# Patient Record
Sex: MALE | Race: WHITE | NOT HISPANIC OR LATINO
[De-identification: names, ages, dates, MRNs, and addresses within clinical notes are randomized per-mention and may not be internally consistent; named-entity substitution may affect disease eponyms.]

---

## 2021-11-23 ENCOUNTER — TRANSCRIPTION ENCOUNTER (OUTPATIENT)
Age: 31
End: 2021-11-23

## 2022-03-24 ENCOUNTER — APPOINTMENT (OUTPATIENT)
Dept: HUMAN REPRODUCTION | Facility: CLINIC | Age: 32
End: 2022-03-24

## 2022-06-15 ENCOUNTER — NON-APPOINTMENT (OUTPATIENT)
Age: 32
End: 2022-06-15

## 2022-06-16 PROBLEM — Z00.00 ENCOUNTER FOR PREVENTIVE HEALTH EXAMINATION: Status: ACTIVE | Noted: 2022-06-16

## 2022-06-18 ENCOUNTER — OUTPATIENT (OUTPATIENT)
Dept: OUTPATIENT SERVICES | Facility: HOSPITAL | Age: 32
LOS: 1 days | End: 2022-06-18
Payer: COMMERCIAL

## 2022-06-18 ENCOUNTER — APPOINTMENT (OUTPATIENT)
Dept: ULTRASOUND IMAGING | Facility: CLINIC | Age: 32
End: 2022-06-18
Payer: COMMERCIAL

## 2022-06-18 DIAGNOSIS — R10.12 LEFT UPPER QUADRANT PAIN: ICD-10-CM

## 2022-06-18 PROCEDURE — 76700 US EXAM ABDOM COMPLETE: CPT

## 2022-06-18 PROCEDURE — 76700 US EXAM ABDOM COMPLETE: CPT | Mod: 26

## 2023-04-22 ENCOUNTER — NON-APPOINTMENT (OUTPATIENT)
Age: 33
End: 2023-04-22

## 2023-05-18 ENCOUNTER — NON-APPOINTMENT (OUTPATIENT)
Age: 33
End: 2023-05-18

## 2023-05-18 ENCOUNTER — APPOINTMENT (OUTPATIENT)
Dept: ORTHOPEDIC SURGERY | Facility: CLINIC | Age: 33
End: 2023-05-18
Payer: COMMERCIAL

## 2023-05-18 VITALS — BODY MASS INDEX: 34.07 KG/M2 | WEIGHT: 212 LBS | HEIGHT: 66 IN

## 2023-05-18 PROCEDURE — 72100 X-RAY EXAM L-S SPINE 2/3 VWS: CPT

## 2023-05-18 PROCEDURE — 99204 OFFICE O/P NEW MOD 45 MIN: CPT

## 2023-05-18 NOTE — PHYSICAL EXAM
[de-identified] : General Appearance: normal without acute distress\par Mental: Alert and oriented x 3\par Psych/affect: appropriate, cooperative\par Gait: Normal gait\par \par Lumbar spine\par Palpation: Nontender palpation\par Motor: 5/5 L2-S1\par Sensory: 2/2 L2-S1\par Straight leg raise: Negative\par Clonus: < 5 beats [de-identified] : 5/18/2023–AP lateral lumbar spine–no abnormalities

## 2023-05-18 NOTE — DISCUSSION/SUMMARY
[de-identified] : Lumbar radiculopathy\par \par Extensive discussion of the natural history of this issue was had with the patient.  We discussed the treatment options focusing on conservative therapy which includes anti-inflammatories, physical therapy/home exercise, & activity modification.  \par -A prescription for meloxicam with the appropriate warnings for GI, cardiac, and renal side effects was given to the patient.  Patient was instructed not to use any other NSAIDs with this medication.\par To the patient failed conservative treatment including steroids I like an MRI of his lumbar spine.  He will follow-up with PM&R with these results to discuss if he is a candidate for steroid injections.

## 2023-05-18 NOTE — HISTORY OF PRESENT ILLNESS
[de-identified] : 5/18/2023–patient presents with 2 months of low back pain.  States is rating down his left leg down to his foot.  He was hunched over cleaning the litter box when he felt a pop in his back in March.  Since then the pain has not been improving.  Takes Aleve for the pain.  Was given a prednisone taper in April but that did not help.  Denies allergies, anticoagulation, or tobacco use.  Negative past medical history

## 2023-05-23 ENCOUNTER — NON-APPOINTMENT (OUTPATIENT)
Age: 33
End: 2023-05-23

## 2023-06-06 ENCOUNTER — APPOINTMENT (OUTPATIENT)
Dept: MRI IMAGING | Facility: CLINIC | Age: 33
End: 2023-06-06

## 2023-06-06 ENCOUNTER — OUTPATIENT (OUTPATIENT)
Dept: OUTPATIENT SERVICES | Facility: HOSPITAL | Age: 33
LOS: 1 days | End: 2023-06-06
Payer: SELF-PAY

## 2023-06-06 ENCOUNTER — APPOINTMENT (OUTPATIENT)
Dept: MRI IMAGING | Facility: CLINIC | Age: 33
End: 2023-06-06
Payer: SELF-PAY

## 2023-06-06 DIAGNOSIS — M54.16 RADICULOPATHY, LUMBAR REGION: ICD-10-CM

## 2023-06-06 PROCEDURE — 72148 MRI LUMBAR SPINE W/O DYE: CPT | Mod: 26

## 2023-06-06 PROCEDURE — 72148 MRI LUMBAR SPINE W/O DYE: CPT

## 2023-06-15 ENCOUNTER — APPOINTMENT (OUTPATIENT)
Dept: ORTHOPEDIC SURGERY | Facility: CLINIC | Age: 33
End: 2023-06-15

## 2023-07-20 ENCOUNTER — APPOINTMENT (OUTPATIENT)
Dept: PHYSICAL MEDICINE AND REHAB | Facility: CLINIC | Age: 33
End: 2023-07-20
Payer: COMMERCIAL

## 2023-07-20 VITALS
SYSTOLIC BLOOD PRESSURE: 151 MMHG | WEIGHT: 212 LBS | BODY MASS INDEX: 34.07 KG/M2 | HEIGHT: 66 IN | DIASTOLIC BLOOD PRESSURE: 89 MMHG

## 2023-07-20 DIAGNOSIS — M54.50 LOW BACK PAIN, UNSPECIFIED: ICD-10-CM

## 2023-07-20 DIAGNOSIS — M54.16 RADICULOPATHY, LUMBAR REGION: ICD-10-CM

## 2023-07-20 DIAGNOSIS — M47.816 SPONDYLOSIS W/OUT MYELOPATHY OR RADICULOPATHY, LUMBAR REGION: ICD-10-CM

## 2023-07-20 PROCEDURE — 99204 OFFICE O/P NEW MOD 45 MIN: CPT | Mod: GC

## 2023-07-20 RX ORDER — MELOXICAM 15 MG/1
15 TABLET ORAL DAILY
Qty: 30 | Refills: 1 | Status: ACTIVE | COMMUNITY
Start: 2023-05-18 | End: 1900-01-01

## 2023-07-20 NOTE — HISTORY OF PRESENT ILLNESS
[FreeTextEntry1] : Mr. RAEANN CONDON is a 33 year old male who presents with low back pain. \par \par Location: lower back \par Onset: mid March, no specific inciting event, but has a 6 month old daughter for which he holds to feed\par Provocation/Palliative: sitting down worsens pain,walking alleviates pain \par Quality: Dull, aching \par Radiation:posterior L thigh and lateral knee \par Severity: 8/10 at times, mild now\par Timing: intermittent \par \par Denies any associated numbness. Denies any associated leg weakness. Denies any loss of bowel/bladder control or any groin numbness.\par Previous medications trialed: medrol pack, ibuprofen with some relief\par Previous procedures relevant to complaint: no \par Conservative therapy tried?: no recent PT \par

## 2023-07-20 NOTE — PHYSICAL EXAM
[FreeTextEntry1] : PE:\par Constitutional: In NAD, calm and cooperative\par MSK (Back)\par 	Inspection: no gross swelling identified\par 	Palpation: no tenderness in the b/l lumbar paraspinals \par 	ROM: No pain at end lumbar flexion/extension. full ROM \par 	Strength: 5/5 strength in bilateral lower extremities\par 	Reflexes: 2+ reflexes in bilateral LE,  negative clonus bilaterally\par 	Sensation: Intact to light touch in bilateral lower extremities\par Special tests:\par SLR: Negative \par TITA:Negative \par FADIR: Negative \par Facet loading: Negative

## 2023-07-20 NOTE — ASSESSMENT
[FreeTextEntry1] : Mr. RAEANN CONDON is a 33 year old male who presents with low back pain. Pain likely due to a lumbar radiculopathy. Denies any red flag signs. Will recommend:\par - MRI L Spine reviewed\par - Mobic 15mg QD PRN. Patient advised on cardiac/gi/renal side effects. Patient encouraged to take medication with food and not with other NSAIDs. \par - Start PT 2-3x/week for stretching, strengthening (especially of core muscles), ROM exercises, HEP and modalities PRN including myofascial release, moist heat \par - Briefly discussed ESIs in future if pain persists. \par \par Return in 6 weeks. Patient aware of red flag signs including any changes to their bowel/bladder control, groin numbness or new weakness. Patient knows to seek immediate attention by calling 911 or going to nearest ER if these symptoms appear.

## 2023-07-20 NOTE — DATA REVIEWED
[FreeTextEntry1] : MR L Spine 6/6/23 reviewed by me: Left lateral recess narrowing at L4-5 seen with mass effect on left L5 nerve roots\par \par   MR Lumbar Spine No Cont             Final\par \par No Documents Attached\par \par \par \par \par   EXAM: [id number] - MR SPINE LUMBAR  - ORDERED BY:  THEA PETERSON\par \par \par PROCEDURE DATE:  06/06/2023\par \par \par \par INTERPRETATION:  CLINICAL INFORMATION: Lumbar radiculopathy to left lower extremity for several months duration. Muscle strain. Reports feeling unsteady when walking or getting up from chair. No prior surgery.\par \par ADDITIONAL CLINICAL INFORMATION: Low back muscle strain S39.012A\par \par TECHNIQUE: Multiplanar, multisequence MRI was performed of the lumbar spine.\par IV Contrast: NONE\par \par PRIOR STUDIES: Lumbar spine x-ray 5/18/2023 from ortho P[facility].\par \par \par FINDINGS:\par \par LOCALIZER: No additional findings.\par \par BONES: No acute fracture or osteonecrosis. Mild anterior wedging of T11 likely reflecting normal thoracolumbar transition. Small T10-T11 and T11-T12 Schmorl's nodes are noted.\par \par ALIGNMENT: The alignment is normal.\par \par SACROILIAC JOINTS/SACRUM: There is no sacral fracture. The SI joints are partially visualized but are intact.\par \par CONUS AND CAUDA EQUINA: The distal cord and conus are normal in signal. Conus terminates at L1.\par \par VISUALIZED INTRAPELVIC/INTRA-ABDOMINAL SOFT TISSUES: Normal.\par \par PARASPINAL SOFT TISSUES: Unremarkable.\par \par \par INDIVIDUAL LEVELS:\par \par LOWER THORACIC SPINE: Visualized on sagittal and coronal. No spinal canal or neuroforaminal stenosis.\par \par L1-L2: No spinal clinical narrowing. No neural foraminal narrowing bilaterally.\par \par L2-L3: No spinal canal narrowing. No neural foraminal narrowing bilaterally.\par \par L3-L4: No spinal canal narrowing. No neural foraminal narrowing bilaterally.\par \par L4-L5: Disc bulging is present lateralizing to the left with small superimposed left paracentral caudal extrusion. Mild narrowing of the spinal canal. Moderate-to-severe left lateral recess narrowing. There is mass effect on the left descending L5 nerve root. Mild right neural foraminal narrowing. Mild to moderate left neural foraminal narrowing.\par \par L5-S1: No spinal canal narrowing. No neural foraminal narrowing bilaterally.\par \par \par IMPRESSION:\par 1.  L4-L5 demonstrates mild spinal canal stenosis with moderate-to-severe narrowing of the left lateral recess causing mass effect on the descending left L5 nerve roots. Mild to moderate neural foraminal stenosis is also noted.\par \par --- End of Report ---\par \par \par \par \par \par LYNN LEVINE DO; Resident Radiologist\par This document has been electronically signed.\par MARY MELCHOR MD; Attending Radiologist\par This document has been electronically signed. Jun 9 2023 12:21PM\par \par  \par \par  Ordered by: THEA PETERSON       Collected/Examined: 06Jun2023 06:08PM       \par Verified by: THEA PETERSON 09Jun2023 12:52PM       \par  Result Communication: No patient communication needed at this time;\par Stage: Final       \par  Performed at: [facility] [place]       Resulted: 09Jun2023 08:58AM       Last Updated: 09Jun2023 12:52PM       Accession: [id number]